# Patient Record
(demographics unavailable — no encounter records)

---

## 2025-06-24 NOTE — HISTORY OF PRESENT ILLNESS
[FreeTextEntry1] : 61 yo here for follow up. Patient know BRCA1 positive and had TLH/BSO at [facility] for prophylaxis. 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 Reports she has not been sexually active for >1yr due to surgery and other issues. She recently attempted intercourse and was extremely painful. Has been using hyaluronic acid and recently purchased oil based lubricator.

## 2025-06-24 NOTE — PHYSICAL EXAM
[Labia Majora] : normal [Labia Minora] : normal [Normal] : normal [Atrophy] : atrophy [Absent] : absent [Uterine Adnexae] : absent [FreeTextEntry4] : small bilateral irritation

## 2025-06-24 NOTE — DISCUSSION/SUMMARY
[FreeTextEntry1] : 59 yo s/p TLH/BSO, BRCA1 positive, with vaginal atrophy  - f/up vaginitis - encouraged continued use of lubricants daily and prior to/during intercourse - patient to try vaginal dilators at home - discussed high tone of pelvic floor. Offered pelvic floor PT referral, patient declined. Also discussed possible initiation of flexeril for muscular pain. Will consider - repeat DEXA - return PRN